# Patient Record
Sex: MALE | Employment: UNEMPLOYED | URBAN - METROPOLITAN AREA
[De-identification: names, ages, dates, MRNs, and addresses within clinical notes are randomized per-mention and may not be internally consistent; named-entity substitution may affect disease eponyms.]

---

## 2017-04-03 ENCOUNTER — HOSPITAL ENCOUNTER (EMERGENCY)
Facility: HOSPITAL | Age: 32
Discharge: HOME | End: 2017-04-03
Attending: PEDIATRICS
Payer: MEDICAID

## 2017-04-03 VITALS
HEIGHT: 73 IN | DIASTOLIC BLOOD PRESSURE: 78 MMHG | SYSTOLIC BLOOD PRESSURE: 135 MMHG | BODY MASS INDEX: 36.45 KG/M2 | WEIGHT: 275 LBS | RESPIRATION RATE: 16 BRPM | OXYGEN SATURATION: 99 % | TEMPERATURE: 98 F | HEART RATE: 95 BPM

## 2017-04-03 DIAGNOSIS — R51.9 HEADACHE, UNSPECIFIED HEADACHE TYPE: ICD-10-CM

## 2017-04-03 DIAGNOSIS — T78.40XA ALLERGIC REACTION, INITIAL ENCOUNTER: Primary | ICD-10-CM

## 2017-04-03 PROCEDURE — 99283 EMERGENCY DEPT VISIT LOW MDM: CPT

## 2017-04-03 PROCEDURE — 96372 THER/PROPH/DIAG INJ SC/IM: CPT

## 2017-04-03 RX ORDER — DIPHENHYDRAMINE HCL 25 MG
25 CAPSULE ORAL ONCE
Status: COMPLETED | OUTPATIENT
Start: 2017-04-03 | End: 2017-04-03

## 2017-04-03 RX ORDER — ACETAMINOPHEN 500 MG
1000 TABLET ORAL ONCE
Status: COMPLETED | OUTPATIENT
Start: 2017-04-03 | End: 2017-04-03

## 2017-04-03 RX ORDER — KETOROLAC TROMETHAMINE 30 MG/ML
60 INJECTION, SOLUTION INTRAMUSCULAR; INTRAVENOUS ONCE
Status: COMPLETED | OUTPATIENT
Start: 2017-04-03 | End: 2017-04-03

## 2017-04-03 RX ORDER — PREDNISONE 20 MG/1
40 TABLET ORAL DAILY
Qty: 10 TABLET | Refills: 0 | Status: SHIPPED | OUTPATIENT
Start: 2017-04-03 | End: 2017-04-08

## 2017-04-03 NOTE — ED INITIAL ASSESSMENT (HPI)
Pt presents to the ed with c/o rash to his scalp s/p using hair dye last night and rash is worse. No other complaints noted at this time, no sob or AGNES noted. No other complaints noted.

## 2017-04-03 NOTE — ED PROVIDER NOTES
To ER for evaluation of reaction to hair dye that he used last night. He states he now has redness and itchiness on his scalp and around his neck with clear drainage complaining of headache.   I agree with a history physical exam and management as outlined

## 2017-04-03 NOTE — ED PROVIDER NOTES
Patient Seen in: [facility] Emergency Department    History   Patient presents with:   Allergic Rxn Allergies (immune)    Stated Complaint: scalp inflammation after dying hair    HPI    61-year-old male presents to the emergency department for evalua Smoking Status: Former Smoker                   Packs/Day: 0.00  Years:         Alcohol Use: Yes                Comment: social      Review of Systems    Positive for stated complaint: scalp inflammation after dying hair  Other systems are as noted in [facility] Allergic reaction, initial encounter  (primary encounter diagnosis)  Headache, unspecified headache type    Disposition:  Discharge    Follow-up:  David Flannery MD  [address] [phone]    Schedule an appointment as s

## 2017-04-03 NOTE — ED NOTES
Pt had a hair coloration place at about 1845 last night at his [employer]. Pt left it on for about 45 minutes as directed. Pt now has a reaction noted this morning of red raised clear vesicles present that leak a serous fluid.   Pt has burning and itch

## 2017-07-05 PROCEDURE — 80307 DRUG TEST PRSMV CHEM ANLYZR: CPT | Performed by: FAMILY MEDICINE

## 2017-11-30 PROBLEM — F40.10 SOCIAL PHOBIA: Status: ACTIVE | Noted: 2017-11-30

## 2017-11-30 PROCEDURE — 80307 DRUG TEST PRSMV CHEM ANLYZR: CPT | Performed by: FAMILY MEDICINE

## 2020-06-28 ENCOUNTER — HOSPITAL ENCOUNTER (EMERGENCY)
Facility: HOSPITAL | Age: 35
Discharge: HOME | End: 2020-06-28
Attending: EMERGENCY MEDICINE
Payer: MEDICAID

## 2020-06-28 ENCOUNTER — APPOINTMENT (OUTPATIENT)
Dept: GENERAL RADIOLOGY | Facility: HOSPITAL | Age: 35
End: 2020-06-28
Attending: EMERGENCY MEDICINE
Payer: MEDICAID

## 2020-06-28 VITALS
TEMPERATURE: 98 F | DIASTOLIC BLOOD PRESSURE: 84 MMHG | SYSTOLIC BLOOD PRESSURE: 137 MMHG | OXYGEN SATURATION: 98 % | RESPIRATION RATE: 18 BRPM | HEART RATE: 79 BPM

## 2020-06-28 DIAGNOSIS — M25.561 ACUTE PAIN OF RIGHT KNEE: ICD-10-CM

## 2020-06-28 DIAGNOSIS — M25.571 ACUTE RIGHT ANKLE PAIN: ICD-10-CM

## 2020-06-28 DIAGNOSIS — M54.40 BACK PAIN OF LUMBAR REGION WITH SCIATICA: ICD-10-CM
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
## 2020-06-28 PROCEDURE — 99284 EMERGENCY DEPT VISIT MOD MDM: CPT

## 2020-06-28 PROCEDURE — 72110 X-RAY EXAM L-2 SPINE 4/>VWS: CPT | Performed by: EMERGENCY MEDICINE

## 2020-06-28 PROCEDURE — 73590 X-RAY EXAM OF LOWER LEG: CPT | Performed by: EMERGENCY MEDICINE

## 2020-06-28 RX ORDER — TRAMADOL HYDROCHLORIDE 50 MG/1
50 TABLET ORAL ONCE
Status: COMPLETED | OUTPATIENT
Start: 2020-06-28 | End: 2020-06-28

## 2020-06-28 RX ORDER — DIAZEPAM 5 MG/1
5 TABLET ORAL 3 TIMES DAILY PRN
Qty: 20 TABLET | Refills: 0 | Status: SHIPPED | OUTPATIENT
Start: 2020-06-28 | End: 2020-07-08

## 2020-06-28 RX ORDER — TRAMADOL HYDROCHLORIDE 50 MG/1
TABLET ORAL EVERY 6 HOURS PRN
Qty: 10 TABLET | Refills: 0 | Status: SHIPPED | OUTPATIENT
Start: 2020-06-28 | End: 2020-07-05

## 2020-06-28 RX ORDER — METHYLPREDNISOLONE 4 MG/1
TABLET ORAL
Qty: 1 PACKAGE | Refills: 0 | Status: SHIPPED | OUTPATIENT
Start: 2020-06-28

## 2020-06-28 RX ORDER — NAPROXEN 500 MG/1
500 TABLET ORAL 2 TIMES DAILY PRN
Qty: 20 TABLET | Refills: 0 | Status: SHIPPED | OUTPATIENT
Start: 2020-06-28

## 2020-06-28 RX ORDER — DIAZEPAM 5 MG/1
5 TABLET ORAL ONCE
Status: COMPLETED | OUTPATIENT
Start: 2020-06-28 | End: 2020-06-28
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
## (undated) NOTE — ED AVS SNAPSHOT
[facility] Emergency Department    [place] [address] [place] [address]    Phone:  [phone]    Fax:  [phone]           Mr. Spangler Ishmael   MRN: [id number]    Department:  [facility] Emergency Department   Date of Visit: Insurance plans vary and the physician(s) referred by the ER may not be covered by your plan. Please contact your insurance company to determine coverage for follow-up care and referrals.     [facility]  Main [phone]  Pediatric If the emergency physician has read X-rays, these will be re-interpreted by a radiologist.  If there is a significant change in your reading, you will be contacted. Please make sure we have your correct phone number before you leave.  After you leave, you s and ask to get set up for an insurance coverage that is in-network with [facility]. [employer]     Sign up for [employer], your secure online medical record.   [employer] will allow you to access patient instructions from your recent visit,  view

## (undated) NOTE — ED AVS SNAPSHOT
[facility] Emergency Department    [place] [address]    Phone:  [phone]    Fax:  [phone]           Mr. Worrell    MRN: [id number]    Department:  [facility] Emergency Department   Date of Visit: IF THERE IS ANY CHANGE OR WORSENING OF YOUR CONDITION, CALL YOUR PRIMARY CARE PHYSICIAN AT ONCE OR RETURN IMMEDIATELY TO THE EMERGENCY DEPARTMENT.     If you have been prescribed any medication(s), please fill your prescription right away and begin taking t